# Patient Record
Sex: MALE | Race: WHITE | URBAN - METROPOLITAN AREA
[De-identification: names, ages, dates, MRNs, and addresses within clinical notes are randomized per-mention and may not be internally consistent; named-entity substitution may affect disease eponyms.]

---

## 2021-01-12 ENCOUNTER — PROCEDURE VISIT (OUTPATIENT)
Dept: AUDIOLOGY | Age: 71
End: 2021-01-12

## 2021-01-12 DIAGNOSIS — H90.3 SENSORINEURAL HEARING LOSS, BILATERAL: Primary | ICD-10-CM

## 2021-01-12 PROCEDURE — 99999 PR OFFICE/OUTPT VISIT,PROCEDURE ONLY: CPT | Performed by: AUDIOLOGIST

## 2021-01-12 NOTE — PROGRESS NOTES
Gerardo Fink   1950, 78 y.o. male  <[id number]>       HEARING AID EVALUATION    Gerardo Fink was seen today, 1/12/2021 for a Hearing Aid Evaluation. Patient has no prior history of hearing aid use. A pair of akbar Aguilera P90-R hearing aids were programmed to 80% and were used in today's appointment. He previously spoke with an audiologist about devices (discussed Widex and ReSound) and is here today for additional information in his decision making process. He brought a list of questions that were addressed during today's visit. Audiogram was completed 11/12/2020 at [facility], and a chart review revealed that he was medically cleared for hearing aids by Dr. Corbin Stevens at that time. Audiogram was reviewed and scanned to media tab. INSURANCE:  Insurance information was not available prior to appointment today. Will contact his supplement prior to fitting to review benefit information. Temperature taken at intake: Within Normal Limits    LIFESTYLE EVALUATION:       Primary listening situations Gerardo Fink would like to improve:  1. Conversation with his fiance, she regularly has to repeat herself  2. Hearing TV  3. 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 He noted his brother wears Oticon devices. PHONE:  · Phone connectivity:  Patient has an [address]  · Concerns for hearing on phone calls: none  · Interested in adjustments via jaspal, streaming would be nice but not necessary (also interested in TV streaming capability)    We reviewed and demonstrated jaspal features and phone call use via hearing aids.     DEVICE SELECTION:       After a discussion of the various styles, technology levels, and features of available in hearing aid technology in relation to his lifestyle needs, Lisa Stacy has decided to consider the options and contact Audiology when a decision has been made. Technology to be considered: (2) Phonak Audeo P70-R or P50-R, 2M receivers, medium open domes, color P6 (silver)      Patient cost due at time of fitting: $TBD; dependent on technology level selected.  $4900 for Advanced (P70-R) or $4000 for Standard (P50-R). PATIENT EDUCATION:         - Verbally reviewed various styles, technology levels, and features of available hearing aid technology and realistic expectations with amplification.   - Patient was provided with a brochure for [employer]  hearing aids. RECOMMENDATIONS:        - Contact Audiology when a decision has been made to pursue hearing aids. Discussed hearing aid fitting to be scheduled 2-3 weeks following call with devices selected. No charge for today's appointment.       [facility] [place], [place]  Audiologist

## 2021-01-26 ENCOUNTER — PROCEDURE VISIT (OUTPATIENT)
Dept: AUDIOLOGY | Age: 71
End: 2021-01-26
Payer: MEDICARE

## 2021-01-26 DIAGNOSIS — H90.3 SENSORINEURAL HEARING LOSS, BILATERAL: Primary | ICD-10-CM

## 2021-01-26 PROCEDURE — V5160 DISPENSING FEE BINAURAL: HCPCS | Performed by: AUDIOLOGIST

## 2021-01-26 PROCEDURE — V5265 EAR MOLD/INSERT, DISP: HCPCS | Performed by: AUDIOLOGIST

## 2021-01-26 PROCEDURE — V5011 HEARING AID FITTING/CHECKING: HCPCS | Performed by: AUDIOLOGIST

## 2021-01-26 PROCEDURE — V5020 CONFORMITY EVALUATION: HCPCS | Performed by: AUDIOLOGIST

## 2021-01-26 NOTE — PROGRESS NOTES
Salena Finley   1950, 78 y.o. male   <[id number]>     HEARING AID FITTING      RIGHT EAR: /MODEL: Joyice Pair P50-R  SN: [id number]  WIRE/DOME: 2M/medium open    LEFT EAR: /MODEL: Phonak Audeo P50-R  SN: [id number]  WIRE/DOME: 2M/medium open    : /MODEL: Phonak  Ethan Bailey  SN: [id number]   HAF: 2021  WARRANTY: 2022    BUTTONS: Short Press (Volume): Up raises, Down lowers  PROGRAMS: [employer]  PHONE CONNECTIVITY: Paired to Android phone, use of jaspal      Salena Finley was seen today, 2021, to be fit with binaural Joyice Pair P50-R hearing aids. Devices were connected to fitting software, and hearing aids were programmed to 90% target gain. Reviewed use of volume control. Paired to phone and demonstrated jaspal and phone call use. Device orientation was completed, includin. Hearing aid insertion/removal  2. Buttons/Indicators  3. Battery charging, life expectancy   4. Battery charging and insertion/removal  5. Cleaning/maintenance of the device  6. Telephone use and phone placement  7. Acclimatization period and importance of consistent use of devices  8. Realistic expectations with hearing aids  9. Repair/Loss & Damage warranty information  10. 30-day right-to-return period    Salena Finley noted good sound quality. It was recommended that patient return for a follow-up appointment within the 30-day right-to-return period for further programming adjustments and education of the devices as warranted. PATIENT EDUCATION:     Salena Finley was provided with the Hearing Aid Fitting Checklist as a reference of items discussed at today's appointment. Patient may find additional product information in the provided hearing aid  device manual.  He was also provided with a description of hearing aid button use from the software. RECOMMENDATIONS:        - Goal of consistent daily use of both devices during all waking hours. - Return for hearing aid check in first 30 days; an appointment was scheduled for 2/15/2021. He may call to reschedule, as he did not have a calendar with him today to review his schedule. Will review domes/wax traps at next visit. Patient paid $4000. See associated charges below.     Description Code Amount   Hearing Aids  $3240.00   Dispensing Fee  $300.00   Fitting Fee (Non-Refundable)  $300.00   Earmold Non-Custom  x2 $50.00 x2 = $100.00   1-Year Service Plan  INCLUDED   Conformity Evaluation   (Real-Ear Measurements)  $60.00           [facility] Fidel Vasquez  Audiologist

## 2021-02-02 ENCOUNTER — TELEPHONE (OUTPATIENT)
Dept: ENT CLINIC | Age: 71
End: 2021-02-02

## 2021-02-02 NOTE — TELEPHONE ENCOUNTER
Attempted to return patient's call, no answer. LVM to call office at [phone].       [facility] [place], [place]  Audiologist
guitar. We discussed that we can listen to the guitar and notice if there are differences between the 50 or 90 (demo) devices. Also discussed considerations for another , ReSound, given his desire to have phone connectivity and be able to make adjustments. Discussed current limitations of ReSound and Android, and that he would only be able to use the jaspal, no direct streaming. Discussed that we will likely have hte appointment on Friday, and also follow-up in the coming weeks for further discussion and programming as needed. He was agreeable and plans to discuss hearing aids with friends/family that are currently wearing devices to gather more experiential information.         [facility] [place], [place]  Audiologist

## 2021-02-04 ENCOUNTER — TELEPHONE (OUTPATIENT)
Dept: AUDIOLOGY | Age: 71
End: 2021-02-04

## 2021-02-04 NOTE — TELEPHONE ENCOUNTER
Jonatan Cardona called into office today. He noted he reviewed additional information from [employer] Features), and was intrigued by Music automatic program of Advanced (70) and Premium (90) devices. Discussed that the feature should function identically between the 90 and 70 levels, however, we would need to make a manual program in his current 50 level devices. Discussed that I plan to have him play his guitar with his current devices as is, then with demo 90s as is, to see if there is a noticeable improvement with the automatic music program, or if a manual program will suffice. He noted he is enjoying Bluetooth, and we can further discuss benefits/limitations of a different  during his appointment as well.         [facility] [place], [place]  Audiologist

## 2021-02-05 ENCOUNTER — PROCEDURE VISIT (OUTPATIENT)
Dept: AUDIOLOGY | Age: 71
End: 2021-02-05

## 2021-02-05 DIAGNOSIS — H90.3 SENSORINEURAL HEARING LOSS, BILATERAL: Primary | ICD-10-CM

## 2021-02-05 PROCEDURE — 99999 PR OFFICE/OUTPT VISIT,PROCEDURE ONLY: CPT | Performed by: AUDIOLOGIST

## 2021-02-05 NOTE — PROGRESS NOTES
Keegan Romero   1950, 78 y.o. male   <[id number]>     RIGHT EAR: /MODEL: Anna Nayak P50-R  SN: [id number]  WIRE/DOME: 2M/medium open    LEFT EAR: /MODEL: Anna Nayak P50-R  SN: [id number]  WIRE/DOME: 2M/medium open    : /MODEL: Phonak  Case Combi  SN: [id number]   HAF: 1/26/2021  WARRANTY: 4/18/2022     BUTTONS: Short Press (Volume): Up raises, Down lowers  PROGRAMS: [employer]  PHONE CONNECTIVITY: Paired to Android phone, use of jaspal      [address]      Keegan Romero was seen today, 2/5/2021, for a hearing aid check appointment. We have discussed concerns via phone prior to appointment. Goal for today's visit is to listen with various hearing aids and settings while playing his acoustic guitar to determine listening preferences, and determine if a change in device is warranted during right-to-return period. PROCEDURES:     Otoscopy revealed: Clear ear canals bilaterally  Hearing aids were cleaned and checked. A listening check revealed good function of the devices. Connected his hearing aids to the fitting software and added a manual program for music with no noise reduction, sound recover turned off, and MPO increased. Datalogging revealed 11.7 hours of use per day. Additionally, programmed Phonak Audeo P90-R and Oticon OpnS1 miniRITE-R devices, each with a manual program with similar settings as above. Lakhwinder listened to devices while playing his guitar and utilizing the general/automatic program, in addition to a manual music program.  See notes below. HEARING AIDS WHILE PLAYING GUITAR:  Anna Nayak P50-R (currently fit devices)   - General: Immediate \"slap back\"   - Music (manual): Improved but not great. Made additional fine tuning by turning off whistle block and increasing MPO more, was slightly better, but not enough that he would want to wear the devices while playing.   Anna Nayak P90-R (clinic demos, same ): - General: Much improved. - Music (manual): Even more improved. Oticon OpnS 1 miniRITE-R (clinic demos, different ):   - General: Similar to P50s. - Music (manual): Slight improvement compared to [facility] processing, but not preferable to Phonak. Discussed his current preferences based on today's testing in the [place]ley. We discussed current options of keeping current devices, upgrading current  to higher technology level, changing devices for different  and same technology level, or changing  and upgrade technology level. We discussed benefits/limitations of streaming and jaspal adjustments. Currently Phonak is the only  that allows for direct streaming for his current phone ([address]). ReSLayerBoom and Oticon are compatible to use their respective apps, but no direct streaming is available for his current device at this time through these manufacturers, only with Phonak's standard Bluetooth connection. He enjoys phone connectivity for calls through the devices and would prefer to not lose this functionality with a different  at this time. After our discussion today, we agreed to have Bill leave the office with demo Elder Kiah P90-R devices. Loaner agreement was signed, and he was provided with a copy. Reviewed that the preferred music setting can be further adjusted. It can also be identical between the [employer] program and the manual music program.  Paired demo devices to his phone. Forgot his previous devices, and paired demo devices to his phone. Reviewed functions in jaspal and changing between autosense and music programs via push button and jaspal. Also reviewed \"tap control\" features available in P70 and P90 devices. Additionally, he was provided with two medium vented domes.   We have discussed size of domes, acoustics, and  recommendation in the past.  He noted physical fit preference for open domes.  After placing a medium vented dome on the right device, he noted he was hearing more than with the open dome. He preferred to leave the office with open domes, but did want to try the vented dome to see if he perceived a difference. Reviewed how to remove/replace domes. PATIENT EDUCATION:      - Discussed sound processing and preferences noted today for music.     - Reviewed differences in phone connectivity among manufacturers. - Reviewed hearing aid function via push button and tap control, as well as jaspal.   - Provided patient with list of programs and button/tap control features from the fitting software. RECOMMENDATIONS:     - Continue consistent hearing aid use. - Contact office with decision to pursue 70 level technology or keep 50 level of technology (currently in office, as he left with only demo devices). He noted he plans to call the office on Monday 2/8; discussed I will be at our [place] office, but our staff can send me a note with his decision.  - Return for hearing aid checks as needed. Next appointment scheduled for 2/15/2021. At that time, we plan to fit new devices (if we order higher technology level), complete REMs, and make further adjustments with devices as warranted. No charge for today's appointment; patient under service warranty through 1/26/2022.       [facility] [place], [place]  Audiologist

## 2021-02-08 ENCOUNTER — TELEPHONE (OUTPATIENT)
Dept: ENT CLINIC | Age: 71
End: 2021-02-08

## 2021-02-09 NOTE — TELEPHONE ENCOUNTER
Returned patient's call and let him know new devices have been ordered - (2) Justina HUFFMAN. He also inquired about repair and loss/damage policies and options that are available through his homeowner's insurance. We discussed devices will come with three year policy for repair and loss/damage, and we will discuss further at next appointment.         [facility] [place], [place]  Audiologist

## 2021-02-16 ENCOUNTER — CLINICAL DOCUMENTATION (OUTPATIENT)
Dept: AUDIOLOGY | Age: 71
End: 2021-02-16

## 2021-02-16 NOTE — PROGRESS NOTES
Fadi Lind   1950, 78 y.o. male   <[id number]>     HEARING AID FITTING - EXCHANGE P50-R for P70-R      RIGHT EAR: /MODELEmsusanna Teresa  SN: [id number]  WIRE/DOME: 2M/medium open    LEFT EAR: /MODEL: Bretta Baker P70-R  SN: [id number]  WIRE/DOME: 2M/medium open    : /MODEL: Phonak  Ethan Bailey  SN: [id number]   HAF: 2021  WARRANTY: 2024    BUTTONS: Short Press (Volume): Up raises, Down lowers  PROGRAMS: AutoSense, 1. Music  PHONE CONNECTIVITY: Paired to Android phone, use of jaspal      Fadi Lind was seen today, 2021, to be fit with binaural Terrietta Baker P70-R hearing aids. His previous Audeo P50-R devices were returned to [employer]OS[employer]IGUEL Rose for credit on 2021. Devices were connected to fitting software, and hearing aids were programmed to 100%. Music program remains with MPO increased and any noise reduction turned off. The music program settings were copied to be the same between the AutoSense and manual programs. Real ear measurements were completed using the Verifit 2. Devices were set to 100% target gain. Fine-tuning adjustments were made for devices to be in good agreement with NAL-NL2 targets for soft, average, and loud conversational speech, with MPO in good agreement. After completing REMs, he noted devices were a bit loud for his preference. He was most comfortable at 90% target gain, additionally decreased low frequency gain by 3 clicks at all inputs. Set auto-acclimatization to go from 90% to 100% target gain over 28 days. Device orientation was completed, includin. Hearing aid insertion/removal  2. Buttons/Indicators  3. Battery charging, life expectancy   4. Battery charging and insertion/removal  5. Cleaning/maintenance of the device  6. Telephone use and phone placement  7. Acclimatization period and importance of consistent use of devices  8. Realistic expectations with hearing aids  9.  Repair/Loss & Damage warranty information  10. 30-day right-to-return period    Paired devices to his phone and verified jaspal use. Additionally, practiced tap control in the software, both by tapping ear and device respectively for both hearing aids. Mihir Lewis noted good sound quality of the devices. It was recommended that patient return for a follow-up appointment within the 30-day right-to-return period for further programming adjustments and education of the devices as warranted. PATIENT EDUCATION:     Mihir Lewis was provided with updated Purchase Agreement, in addition to program and button functionality information from fitting software. Patient may find additional product information in the provided hearing aid  device manual.     RECOMMENDATIONS:      - Consistent daily use of devices. - Discussed programming changes made today and auto-acclimatization.   - Recommended follow-up around mid-March to address any programming adjustments that may be needed. He was encouraged to contact the office if there are any issues with sound quality that arise so that we can schedule a sooner [facility] appointment and address his concerns. Discussed he may bring in his guitar if music program does not seem as beneficial as it did with the P90-R demos. Patient paid $900,  Total cost of the devices fit today is $4900, with hearing aid cost being $4140. Patient paid $4000 on 1/26/2021 for original devices, with hearing aid cost being $3240. All other codes billed on 1/26/2021 remain valid, with difference being $900 greater for advanced level devices fit today compared to standard devices previously fit.       [facility] [place], [place]  Audiologist

## 2021-02-18 ENCOUNTER — PROCEDURE VISIT (OUTPATIENT)
Dept: AUDIOLOGY | Age: 71
End: 2021-02-18
Payer: MEDICARE

## 2021-02-18 DIAGNOSIS — H90.3 SENSORINEURAL HEARING LOSS, BILATERAL: Primary | ICD-10-CM

## 2021-02-18 PROCEDURE — V5261 HEARING AID, DIGIT, BIN, BTE: HCPCS | Performed by: AUDIOLOGIST

## 2021-02-19 ENCOUNTER — CLINICAL DOCUMENTATION (OUTPATIENT)
Dept: AUDIOLOGY | Age: 71
End: 2021-02-19

## 2021-02-19 NOTE — PROGRESS NOTES
DEVICE RETURN - RESOUND    Had previously ordered ReSound One 5 Rechargeable devices. Patient preferred direct connectivity available only through [facility], as his cell phone was not compatible for streaming with ReSound devices at time of ordering. [address] form filled out and scanned to media tab, along with tracking information. Devices to be returned:    - Hearing aid 1, SN: [id number]   - Hearing aid 2, SN: [id number]   - Premium , SN: [id number]      Patient was never fit with devices due to preference for Phonak's features.         [facility] [place], [place]  Audiologist

## 2021-02-26 ENCOUNTER — PROCEDURE VISIT (OUTPATIENT)
Dept: AUDIOLOGY | Age: 71
End: 2021-02-26

## 2021-02-26 DIAGNOSIS — H90.3 SENSORINEURAL HEARING LOSS, BILATERAL: Primary | ICD-10-CM

## 2021-02-26 PROCEDURE — 99999 PR OFFICE/OUTPT VISIT,PROCEDURE ONLY: CPT | Performed by: AUDIOLOGIST

## 2021-02-28 NOTE — PROGRESS NOTES
Justin Delatorre   1950, 78 y.o. male   <[id number]>     RIGHT EAR: /MODELKirstie Mina P70-R  SN: [id number]  WIRE/DOME: 2M/medium open    LEFT EAR: /MODEL: Annita Crowder P70-R  SN: [id number]  WIRE/DOME: 2M/medium open    : /MODEL: Phonak  Case Combsusanna  SN: [id number]   HAF: 2/18/2021  WARRANTY: 5/8/2024     BUTTONS: Short Press (Volume): Up raises, Down lowers  PROGRAMS: AutoSense, 1. Music  PHONE CONNECTIVITY: Paired to Android phone, use of jaspal      [address]      Justin Delatorre was seen today, 2/26/2021, for a hearing aid check appointment. Patient noted he would like to continue to work on Music program.  He has been experiencing \"slapback\" similar to what he had noticed with his P50-R devices. Overall, with communication, he has been very happy with the devices. He noted his wife has said about 90% of situations when he would ask for repetition before have been improved with the devices. Listening to live music, he is happy with the devices, it is more when he is playing his acoustic guitar. PROCEDURES:     Connected devices to fitting software. Decreased MPO somewhat to better match settings from demo devices he wore previously. Addiitonally, Whistle Block was turned off. Adjusted gain to better address slapback sound quality. With music program at baseline volume setting, no slapback was noted. Lakhwinder preferred being able to have some volume control while in the music program.  We found that when increasing the volume, he was having slapback sound quality present. We discussed benefits and limitations, as the gain for louder inputs is causing that adverse sound quality to be noted. With the goal of the hearing aids keeping the sound comfortable and wanting to keep the devices functioning as automatically as possible with limited need for adjustments, we discussed why he wanted the volume control in that instance.   He noted he would prefer being able to listen and adjust the hearing aids like he is in a recording studio, with the ability to increase his sound independently. We discussed this is likely a limitation of the hearing aids. He noted he may need to amplify his guitar to have a similar sound quality he is looking to obtain in those instances. My recommendation was to keep the devices at a level where the baseline volume (0 in jaspal) is comfortable while he is playing guitar himself. When he is listening to music, he should have greater range in volume to adjust in those instances, such as being at a greater distance from the musicians and wanting sound louder, etc.  Additionally, recommended regularly entering manual music program when playing to ensure the sound quality is what he prefers. Discussed goal of Autosense, however, some environments may be missed given the hearing aids are deciding what is music and what is not. When he wants to focus on music, the manual program would be preferred listening program, and if he is noticing he is not hearing music well, can always try changing to this program to see if it helps. PATIENT EDUCATION:      - Reviewed programming adjustments made today and recommended minimal adjustments within jaspal as needed. Devices should be fairly automatic for everyday listening, with use of manual music program when playing and listening to music. RECOMMENDATIONS:     - Continue consistent hearing aid use. - Return for hearing aid checks as needed; recommended he continue with next appointment in March for additional adjustments as needed. No charge for today's appointment; patient under service warranty through 2/18/2022.       [facility] [place], [place]  Audiologist

## 2021-03-18 ENCOUNTER — PROCEDURE VISIT (OUTPATIENT)
Dept: AUDIOLOGY | Age: 71
End: 2021-03-18

## 2021-03-18 DIAGNOSIS — H90.3 SENSORINEURAL HEARING LOSS, BILATERAL: Primary | ICD-10-CM

## 2021-03-18 PROCEDURE — 99999 PR OFFICE/OUTPT VISIT,PROCEDURE ONLY: CPT | Performed by: AUDIOLOGIST

## 2021-03-18 NOTE — PROGRESS NOTES
Mejia Duke   1950, 78 y.o. male   <[id number]>     RIGHT EAR: /MODEL: Phonak Craigemae Code SN: [id number]  WIRE/DOME: 2M/medium open    LEFT EAR: /MODEL: Phonak Craigemae Code SN: [id number]  WIRE/DOME: 2M/medium open    : /MODEL: Phonak  Case Combi  SN: [id number]   HAF: 2/18/2021  WARRANTY: 5/8/2024     BUTTONS: Short Press (Volume): Up raises, Down lowers  PROGRAMS: AutoSense, 1. Music  PHONE CONNECTIVITY: Paired to Android phone, use of jaspal      HEARING [address] [facility]      Mejia Duke was seen today, 3/18/2021, for a hearing aid check appointment. Patient noted he has been \"very satisfied\" with his hearing aids. He does not go into the default settings in the jaspal, like TV, and prefers to go between Hearing Health Science or his manual Music program.  He noted the Music program is performing very well for him and has no concerns. He noted things have been going well, and he almost cancelled today's appointment as there are no pressing concerns or questions to address. PROCEDURES:     Otoscopy revealed: Clear ear canals bilaterally  Hearing aids were cleaned and checked. A listening check revealed good function of the devices. Microphone and  ports were suctioned, domes and wax trapswere changed, and devices completed a desiccant cycle through [employer]. Post-cleaning listening check revealed good function of the devices. Connected devices to fitting software. Datalogging revealed 8.5 hours of use per day. No other programming adjustments were made. PATIENT EDUCATION:      - Reviewed general maintenance and follow-up of the devices. He noted comfort with changing domes and wax traps on his own and declined any additional practice in the office. RECOMMENDATIONS:     - Continue consistent hearing aid use. - Return for hearing aid checks as needed. Discussed follow-up around June/July 2021; he plans to call the office to schedule.   - Contact Audiology

## 2021-04-01 ENCOUNTER — TELEPHONE (OUTPATIENT)
Dept: AUDIOLOGY | Age: 71
End: 2021-04-01

## 2021-04-01 NOTE — TELEPHONE ENCOUNTER
Returned patient's call. He noted that phone calls are dropping after about 20 seconds. He noted the hearing aids are paired to the phone and he is able to use typical Bluetooth connectivity per usual.  He inquired if there were any recommendations. We discussed that it is likely related to the phone. I recommended restarting his phone, and he may also consider forgetting and re-pairing his hearing aids in his Bluetooth menu and jaspal, if desired. Discussed that he may be able to get guidance from his phone carrier if phone calls continue to drop. Also discussed if there are Bluetooth connectivity problems, he may contact [employer]'s consumer support line.       [facility] [place], [place]  Audiologist

## 2021-06-17 ENCOUNTER — TELEPHONE (OUTPATIENT)
Dept: ENT CLINIC | Age: 71
End: 2021-06-17

## 2021-06-17 NOTE — TELEPHONE ENCOUNTER
Returned patient's call. He is having issues with calls being dropped, can happen after 2-3 minutes, around 5 minutes into a call, or sometimes with certain head movements. He has had issues with calls dropping prior to hearing aids as well, and noted that Sloane recommended restarting the phone. We discussed that turning his phone off and back on at least weekly is recommended by [facility] and others for Bluetooth connectivity. Discussed that it may be any combination of phone, service, and/or Bluetooth to hearing aids contributing to this happening.   Recommended contacting his phone carrier as needed, and to contact [employer]'s consumer support for additional guidance, as they will be able to further assist:     [address] (Bluetooth)  Hours: 8am-5pm CST Mon-Fri  [phone]      [facility], [place]  Audiologist

## 2021-10-28 ENCOUNTER — PROCEDURE VISIT (OUTPATIENT)
Dept: AUDIOLOGY | Age: 71
End: 2021-10-28

## 2021-10-28 DIAGNOSIS — H90.3 SENSORINEURAL HEARING LOSS, BILATERAL: Primary | ICD-10-CM

## 2021-10-28 PROCEDURE — 99999 PR OFFICE/OUTPT VISIT,PROCEDURE ONLY: CPT | Performed by: AUDIOLOGIST

## 2021-10-28 NOTE — PROGRESS NOTES
Ida Jamison   1950, 73 y.o. male   <[id number]>     RIGHT EAR: /MODEL: Phonak Aneita Peyer SN: [id number]  WIRE/DOME: 2M/medium open    LEFT EAR: /MODEL: Phonak Aneita Peyer SN: [id number]  WIRE/DOME: 2M/medium open    : /MODEL: Phonak  Ethan Bailey  SN: [id number]   HAF: 2/18/2021   WARRANTY: 5/8/2024     BUTTONS: Short Press (Volume): Up raises, Down lowers  PROGRAMS: [employer], 1. Music, 2. Speech in Loud Noise  PHONE CONNECTIVITY: Paired to Android phone, use of jaspal      HEARING [address] [facility]      Ida Jamison was seen today, 10/28/2021, for a hearing aid check appointment. Patient noted he has been doing well with the hearing aids and has only noted difficulty when in very loud environments, like a night club or wedding reception environment. He has gone to a musical performance at [employer], and noted he heard very well with his typical settings. He does not notice a significant difference when at concerts or other performances between his Automatic or Music programs, however, he strongly prefers the Music program when he is playing [employer]. He noted no other concerns. PROCEDURES:     Otoscopy revealed: Clear ear canals bilaterally  Hearing aids were cleaned and checked. A listening check revealed good function of the devices. Microphone and  ports were suctioned, domes and wax traps were changed, and devices completed a desiccant cycle through [employer]. Post-cleaning listening check revealed good function of the devices. Connected devices to fitting software. A device update was available and was completed. Increased noise management in automatic noise programs. Also created manual Speech in Loud Noise program in P2 position (Autosense, P1: Music, P2: SIN). Demonstrated use and how to change programs via push button and jaspal. Datalogging revealed 12 hours of use per day.   PATIENT EDUCATION:      - Reviewed programming adjustments made today, and navigating manual programs, as well as realistic expectations in loud environments. RECOMMENDATIONS:     - Continue consistent hearing aid use. - Return for hearing aid checks as needed; an appointment was scheduled for 2/9/2022 (end of first year). - Contact Audiology if concerns arise. No charge for today's appointment; patient under service warranty through 2/18/2022.         [facility] [place], [place]  Audiologist

## 2021-11-05 ENCOUNTER — TELEPHONE (OUTPATIENT)
Dept: ENT CLINIC | Age: 71
End: 2021-11-05

## 2021-11-05 NOTE — TELEPHONE ENCOUNTER
Patient called stating that he has reached out via phone and text about his blue tooth connection. Patient is requesting a call back.

## 2021-11-05 NOTE — TELEPHONE ENCOUNTER
Returned patient's call, and he then returned call to office on his landline. Walked through [employer], and he was able to successfully pair to the phone and the jaspal.       [facility] [place], [place]  Audiologist

## 2021-11-22 ENCOUNTER — PROCEDURE VISIT (OUTPATIENT)
Dept: AUDIOLOGY | Age: 71
End: 2021-11-22

## 2021-11-22 DIAGNOSIS — H90.3 SENSORINEURAL HEARING LOSS, BILATERAL: Primary | ICD-10-CM

## 2021-11-22 PROCEDURE — 99999 PR OFFICE/OUTPT VISIT,PROCEDURE ONLY: CPT | Performed by: AUDIOLOGIST

## 2021-11-22 NOTE — PROGRESS NOTES
Audelia Meigs   1950, 73 y.o. male   <[id number]>       RIGHT EAR: /MODEL: Phonak Gerfranky Clawson SN: [id number]  WIRE/DOME: 2M/medium open    LEFT EAR: /MODEL: Phonak Geroge Clawson SN: [id number]  WIRE/DOME: 2M/medium open    : /MODEL: Phonak  Ethan Bailey  SN: [id number]   HAF: 2/18/2021   WARRANTY: 5/8/2024     BUTTONS: Short Press (Volume): Up raises, Down lowers  PROGRAMS: [employer]ense, 1. Music  PHONE CONNECTIVITY: Paired to Android phone, use of jaspal      HEARING [address] [facility]      Audelia Meigs was seen today, 11/22/2021, for a hearing aid check appointment. Patient noted he has been very happy with his hearing aids. The only change he would like is to remove the speech in loud noise program (P2). He has not noticed any improvement compared to his typical hearing aid settings, and it has become more difficult to navigate through the push buttons. PROCEDURES:     Otoscopy revealed: Clear ear canals bilaterally   Connected devices to fitting software. Removed speech in noise program.  Further strengthened noise block and impact noise in automatic noise programs. Datalogging revealed 14 hours of use per day. Confirmed programs were as he desired via push button and in jaspal. PATIENT EDUCATION:      - Reviewed programming changes made today. RECOMMENDATIONS:     - Continue consistent hearing aid use. - Return for hearing aid checks as needed. End of first year appointment previously scheduled for 2/9/2022.  - Contact Audiology if concerns arise. No charge for today's appointment; patient under service warranty through 2/18/2022.         [facility] [place], [place]  Audiologist

## 2022-02-09 ENCOUNTER — PROCEDURE VISIT (OUTPATIENT)
Dept: AUDIOLOGY | Age: 72
End: 2022-02-09

## 2022-02-09 DIAGNOSIS — H90.3 SENSORINEURAL HEARING LOSS, BILATERAL: Primary | ICD-10-CM

## 2022-02-09 PROCEDURE — 99999 PR OFFICE/OUTPT VISIT,PROCEDURE ONLY: CPT | Performed by: AUDIOLOGIST

## 2022-02-09 NOTE — PROGRESS NOTES
Meli Webber   1950, 73 y.o. male   <[id number]>     RIGHT EAR: /MODEL: Phonak Adrian Son SN: [id number]  WIRE/DOME: 2M/medium open    LEFT EAR: /MODEL: Phonak Yokoeo P70-R  SN: [id number]  WIRE/DOME: 2M/medium open    : /MODEL: Phonak  Ethan Bailey  SN: [id number]   HAF: 2/18/2021   WARRANTY: 5/8/2024     BUTTONS: Short Press (Volume): Up raises, Down lowers  PROGRAMS: [employer], 1. Music  PHONE CONNECTIVITY: Paired to Android phone, use of jaspal      HEARING [address]      Meli Webber was seen today, 2/9/2022, for a hearing aid check appointment. Patient noted he has been overall doing well with his hearing aids. He has noticed some concern for distinguishing pitch in the low frequency range, of note when wearing his headphones in his home studio without his hearing aids. He is concerned if there may be some degree of change in his hearing. He has not noticed a remarkable difference in sound quality while wearing his hearing aids. PROCEDURES:     Otoscopy revealed: Clear ear canals bilaterally  Hearing aids were cleaned and checked. A listening check revealed good function of the devices. Microphone and  ports were suctioned, domes and wax traps,  were changed, and devices completed a desiccant cycle through [employer]. Post-cleaning listening check revealed good function of the devices. Connected devices to fitting software. Completed in-situ audiometry with no remarkable change (within one step up or down from audiogram). Adjusted for these changes with no remarkable change in sound quality, so reverted to audiogram settings already in hearing aids. Discussed that there is no obvious change in hearing sensitivity based on these findings. He did note that the tones he has difficulty differentiating are of lower pitch than what was played through the hearing aids (250 Hz as lowest).   We may consider listening to lower frequency tones in the sound bradley in the future. Datalogging revealed 13.7 hours of use per day. PATIENT EDUCATION:      - Reviewed in-situ audiometry findings. Discussed there is likely no remarkable change in hearing sensitivity given these findings. - Reviewed typical hearing aid follow-up. RECOMMENDATIONS:     - Continue consistent hearing aid use. - Return for hearing aid checks as needed; recommended follow-up about every 6-12 months, or as needed. - Discussed troubleshooting of concerns for low tones, including listening with and without headphones, with and without hearing aids. He may try at home to see if this seems clearly related to the headphones. We may consider testing some low frequency tones in the sound bradley if he has concerns without headphones. No charge for today's appointment; this is patient's final appointment within the first year service period. He is aware that future appointments will be at a charge.       [facility] [place], [place]  Audiologist

## 2023-08-08 ENCOUNTER — PROCEDURE VISIT (OUTPATIENT)
Dept: AUDIOLOGY | Age: 73
End: 2023-08-08

## 2023-08-08 DIAGNOSIS — H90.3 SENSORINEURAL HEARING LOSS, BILATERAL: Primary | ICD-10-CM

## 2023-08-08 NOTE — PROGRESS NOTES
Ashley Carbajal   1950, 72 y.o. male   [id number]     RIGHT EAR: /MODEL: Armani Mis P70-R  SN: [id number]  WIRE/DOME: 2M/medium open or medium vented    LEFT EAR: /MODEL: Armani Mis P70-R  SN: [id number]  WIRE/DOME: 2M/medium open or medium vented    : /MODEL: Phonak  Ethan Combsusanna  SN: [id number]   HAF: 2/18/2021   WARRANTY: 5/8/2024      HEARING AID CHECK      Ashley Carbajal was seen today, 8/8/2023, for a hearing aid check appointment. Patient noted he has been doing well with his devices. He inquired about streaming and bass sound quality; he also noted background noise at restaurants can be bothersome. PROCEDURES:     Otoscopy revealed: Clear ear canals bilaterally  Hearing aids were cleaned and checked. A listening check revealed good function of the devices. Microphone and  ports were suctioned, domes and wax traps were changed,  and devices completed a desiccant cycle through [employer]. Post-cleaning listening check revealed good function of the devices. Reviewed changing domes and wax traps; he may use either dome was desired for sound quality. Connected devices to fitting software. Adjusted noise management settings. Datalogging revealed over 13 hours of use per day. PATIENT EDUCATION:      - Reviewed general hearing aid care and maintenance, including domes and wax traps. Provided him with a supply of medium open and medium vented domes. RECOMMENDATIONS:     Continue consistent hearing aid use. Return for hearing aid checks as needed; an appointment was made for 4/17/2024 (end of warranty with Phonak). Contact Audiology if concerns arise. Patient paid $75 for today's appointment.         [facility] [place], [place]  Audiologist

## 2024-04-17 ENCOUNTER — PROCEDURE VISIT (OUTPATIENT)
Dept: AUDIOLOGY | Age: 74
End: 2024-04-17

## 2024-04-17 DIAGNOSIS — H90.3 SENSORINEURAL HEARING LOSS, BILATERAL: Primary | ICD-10-CM

## 2024-04-17 NOTE — PROGRESS NOTES
Mynor Solomon   1950, 74 y.o. male   [id number]     RIGHT EAR: /MODEL: Phonak Audeo P70-R  SN: [id number]  WIRE/DOME: 2M/medium open or medium vented    LEFT EAR: /MODEL: Phonak Audeo P70-R  SN: [id number]  WIRE/DOME: 2M/medium open or medium vented    : /MODEL: Phonak  Case Combi  SN: [id number]   HAF: 2/18/2021   WARRANTY: 5/8/2024      HEARING AID CHECK      Mynor Solomon was seen today, 4/17/2024, for a hearing aid check appointment.  Patient noted he has been doing well with his devices.     PROCEDURES:     Otoscopy revealed: Clear ear canals bilaterally  Connected clinic demo Phonak Audeo P90-R devices to fitting software and transferred previous settings to the demo devices.  Feedback manager was completed.  He signed loaner agreement.  See scanned repair form to be sent with devices to [employer].  PATIENT EDUCATION:      - Reviewed warranty process and pairing process with demo devices.  RECOMMENDATIONS:     Continue consistent hearing aid use.  Return for hearing aid checks as needed; an appointment was made for 5/1/2024.  Contact Audiology if concerns arise.    No charge on this encounter, will collect at next appointment.        Fidel Jacome  Audiologist

## 2024-05-01 ENCOUNTER — PROCEDURE VISIT (OUTPATIENT)
Dept: AUDIOLOGY | Age: 74
End: 2024-05-01
Payer: MEDICARE

## 2024-05-01 ENCOUNTER — PROCEDURE VISIT (OUTPATIENT)
Dept: AUDIOLOGY | Age: 74
End: 2024-05-01

## 2024-05-01 DIAGNOSIS — H90.3 SENSORINEURAL HEARING LOSS, BILATERAL: Primary | ICD-10-CM

## 2024-05-01 PROCEDURE — 92593 PR HEARING AID CHECK BINAURAL: CPT | Performed by: AUDIOLOGIST

## 2024-05-01 PROCEDURE — 92567 TYMPANOMETRY: CPT | Performed by: AUDIOLOGIST

## 2024-05-01 PROCEDURE — 92557 COMPREHENSIVE HEARING TEST: CPT | Performed by: AUDIOLOGIST

## 2024-05-01 NOTE — PATIENT INSTRUCTIONS
speech.      OCCUPATIONAL NOISE EXPOSURE RECREATIONAL NOISE EXPOSURE   Some jobs may have exposure to loud sounds in the workplace.  These jobs may include but are not limited to:     Factory settings  Manufacturing  Construction  Welding  Landscaping  Hairdressing/hairstyling  Musicians  Air Traffic   ... And more! Many activities outside of work may cause permanent hearing loss.  These activities may include but are not limited to:  Lawnmowers, leaf blowers  Farming equipment and animals (such as pigs squealing)  Chainsaws and other power tools  Playing musical instruments and/or singing  Listening to music too loudly - at concerts, through stereo, through ear buds or headphones  Attending sporting events  Attending fireworks shows or using fireworks at home  Use of firearms  ... And more!       REDUCE OR PROTECT YOUR EARS FROM NOISE EXPOSURE    To do your best to avoid noise-induced hearing loss, here are some tips:  Limit exposure to loud sounds.  85 dB (decibels) is safe for 8 hours.  As sounds are louder, the length of time the sound is safe lessens.  These numbers are cumulative across a 24-hour period.  (NIOSH and CDC, 2002)  85 dB is safe for 8 hours  88 dB is safe for 4 hours  91 dB is safe for 2 hours  94 dB is safe for 1 hour  97 dB is safe for 30 minutes  100 dB is safe for 15 minutes  103 dB is safe for 7.5 minutes  106 dB is safe for 3.75 minutes  109 dB is safe for LESS THAN 2 minutes  112 dB is safe for LESS THAN 1 minute  115 dB is safe for ~ 30 seconds  130 dB can cause IMMEDIATE hearing loss  If you are unsure if a sound is too loud, consider checking the sound level with a \"sound level meter\".  There are apps on smart devices, such as \"Decibel X\", that can measure the loudness of the sound.  They are not as accurate as expensive equipment used by scientists, but it will give you a guesstimate of how loud the sound is, and if it may be damaging to your hearing.  If you cannot avoid loud

## 2024-05-01 NOTE — PROGRESS NOTES
Mynor Solomon   1950, 74 y.o. male   [id number]     RIGHT EAR: /MODEL: Phonak Audeo P70-R  SN: [id number]  WIRE/DOME: 2M/medium open or medium vented    LEFT EAR: /MODEL: Phonak Audeo P70-R  SN: [id number]  WIRE/DOME: 2M/medium open or medium vented    : /MODEL: Phonak  Case Combsusanna  SN: [id number]   HAF: 2/18/2021   WARRANTY: 5/8/2024      HEARING AID CHECK      Mynor Solomon was seen today, 5/1/2024, for a hearing aid check appointment.  Patient noted he did hear some extra \"whooshy\" sound at times, such as in his kitchen.  He was in premium devices compared to his typical advanced; discussed this may be related to a different automatic setting being activated, but if it persists with his devices, he should let the office know so adjustments can be made.      PROCEDURES:     Otoscopy revealed: Clear ear canals bilaterally  Hearing aids were connected to fitting software.  A firmware update was available and completed today.  Saved user settings.  Updated audiogram into programming.  Adjusted left ear gain down to better match right hearing aid settings; he noted comfort with these changes.  Kept settings at 100% for his baseline.  Confirmed Music program was still in place.  PATIENT EDUCATION:      - Reviewed general hearing aid care and maintenance, as well as future repair costs as he is out of warranty on 5/8/2024.   - Patient was made aware of changes in pricing ($60.00 for drop-off and $115.00 in-person HAC).     RECOMMENDATIONS:     Continue consistent hearing aid use.  Return for hearing aid checks as needed; an appointment was made for 5/7/2025.  Contact Audiology if concerns arise.    Patient paid $75 for today's appointment; ABN was signed to not bill Medicare.        Fidel Jacome  Audiologist

## 2024-05-01 NOTE — PROGRESS NOTES
middle ear function.      Reliability: Good  Transducer: Inserts    See scanned audiogram dated 5/1/2024 for results.      PATIENT EDUCATION:       The following items were discussed with the patient:   - Good Communication Strategies  - Hearing Loss and Hearing Aids  - Noise-Induced Hearing Loss and use of Hearing Protection Devices (HPDs)     Educational information was shared in the After Visit Summary.                                                RECOMMENDATIONS:                                                                                                                                                                                                                                                                      The following items are recommended based on patient report and results from today's appointment:  - Continue medical follow-up with healthcare providers.  - Retest hearing as medically indicated and/or sooner if a change in hearing is noted.  - Continue hearing aid use and follow-up with dispensing audiologist as needed.  - Utilize \"Good Communication Strategies\" as discussed to assist in speech understanding with communication partners.  - Use hearing protection devices (HPDs), such as protective ear muffs and ear plugs, when exposed to dangerous sound levels.         Fidel Jacome  Audiologist       Degree of   Hearing Sensitivity dB Range   Within Normal Limits (WNL) 0 - 20   Mild 20 - 40   Moderate 40 - 55   Moderately-Severe 55 - 70   Severe 70 - 90   Profound 90 +

## 2024-05-15 ENCOUNTER — PROCEDURE VISIT (OUTPATIENT)
Dept: AUDIOLOGY | Age: 74
End: 2024-05-15

## 2024-05-15 DIAGNOSIS — H90.3 SENSORINEURAL HEARING LOSS, BILATERAL: Primary | ICD-10-CM

## 2024-05-15 NOTE — PROGRESS NOTES
Mynor Solomon   1950, 74 y.o. male   [id number]     RIGHT EAR: /MODEL: Phonak Audeo P70-R  SN: [id number]  WIRE/DOME: 2M/medium open or medium vented    LEFT EAR: /MODEL: Phonak Audeo P70-R  SN: [id number]  WIRE/DOME: 2M/medium open or medium vented    : /MODEL: Phonak  Case Combsusanna  SN: [id number]   HAF: 2/18/2021   WARRANTY: 5/8/2024      HEARING AID CHECK      Mynor Solomon was seen today, 5/15/2024, for a hearing aid check appointment.  Patient noted he finds the settings to be a bit \"hot\" sounding compared to what he has been used to.  He noted he can have an intermittent high pitched sound; he reported something similar with the demo devices whiel his hearing aids were out for repair.    PROCEDURES:     Hearing aids were connected to fitting software.  Reverted back to February 2022 settings, which he has found comfortable.  Trialed difference in overall gain, and he preferred these settings but at 105%.  Datalogging revealed 12.7 hours of use per day.  PATIENT EDUCATION:      - Reviewed programming changes made and considerations for comfort vs clarity.  RECOMMENDATIONS:     Continue consistent hearing aid use.  Return for hearing aid checks as needed; an appointment was previously made for 5/7/2025.  Contact Audiology if concerns arise.    No charge for today's appointment; continuation from 5/1/2024.      Fidel Jacome  Audiologist

## 2025-05-07 ENCOUNTER — PROCEDURE VISIT (OUTPATIENT)
Dept: AUDIOLOGY | Age: 75
End: 2025-05-07

## 2025-05-07 DIAGNOSIS — H90.3 SENSORINEURAL HEARING LOSS, BILATERAL: Primary | ICD-10-CM

## 2025-05-07 NOTE — PROGRESS NOTES
Mynor Solomon   1950, 75 y.o. male   [id number]     RIGHT EAR: /MODEL: Phonak Audeo P70-R  SN: [id number]  WIRE/DOME: 2M/medium open or medium vented    LEFT EAR: /MODEL: Phonak Audeo P70-R  SN: [id number]  WIRE/DOME: 2M/medium open or medium vented    : /MODEL: Phonak  Case Combi  SN: [id number]   HAF: 2/18/2021   WARRANTY: 5/8/2024      HEARING AID CHECK      Mynor Solomon was seen today, 5/7/2025, for a hearing aid check appointment.  Patient noted he is overall doing well with his devices.      PROCEDURES:     Otoscopy revealed: Clear ear canals bilaterally  Hearing aids were cleaned and checked.  A listening check revealed good function of the devices.  Microphone and  ports were suctioned, domes and wax traps were changed, and devices completed a desiccant cycle through [employer]. Post-cleaning listening check revealed good function of the devices.     Hearing aids were connected to fitting software. No adjustments were made.  Datalogging revealed 12.3 hours of use per day.  PATIENT EDUCATION:      - Reviewed general hearing aid cleaning and maintenance.  RECOMMENDATIONS:     Continue consistent hearing aid use.  Return for hearing aid checks as needed; an appointment was scheduled for 5/7/2025.  Contact Audiology if concerns arise.    No charge for today's appointment; seen as a courtesy due to scheduling error.      Fidel Jacome  Audiologist

## 2025-07-16 ENCOUNTER — TELEPHONE (OUTPATIENT)
Dept: ENT CLINIC | Age: 75
End: 2025-07-16